# Patient Record
Sex: FEMALE | Race: WHITE | NOT HISPANIC OR LATINO | Employment: FULL TIME | URBAN - NONMETROPOLITAN AREA
[De-identification: names, ages, dates, MRNs, and addresses within clinical notes are randomized per-mention and may not be internally consistent; named-entity substitution may affect disease eponyms.]

---

## 2017-01-16 ENCOUNTER — OFFICE VISIT (OUTPATIENT)
Dept: OTOLARYNGOLOGY | Facility: CLINIC | Age: 57
End: 2017-01-16

## 2017-01-16 VITALS
BODY MASS INDEX: 20.39 KG/M2 | TEMPERATURE: 98 F | DIASTOLIC BLOOD PRESSURE: 70 MMHG | SYSTOLIC BLOOD PRESSURE: 116 MMHG | WEIGHT: 108 LBS | RESPIRATION RATE: 20 BRPM | HEIGHT: 61 IN | HEART RATE: 73 BPM

## 2017-01-16 DIAGNOSIS — D49.0 OROPHARYNX NEOPLASM: ICD-10-CM

## 2017-01-16 DIAGNOSIS — K13.70 ORAL MUCOSAL LESION: Primary | ICD-10-CM

## 2017-01-16 PROCEDURE — 99213 OFFICE O/P EST LOW 20 MIN: CPT | Performed by: OTOLARYNGOLOGY

## 2017-01-16 NOTE — PROGRESS NOTES
PRIMARY CARE PROVIDER: Romulo Crawford DMD  REFERRING PROVIDER: No ref. provider found    Chief Complaint   Patient presents with   • Follow-up     BIOPSY OF LESION OF THE LEFT ANTERIOR TONSILLAR       Subjective   History of Present Illness:  Mica Humphrey is a  56 y.o.  female who is here for follow up. She has had continued problems with oral lesions.  Ports the lesions feel itchy and slightly dry.  There is no pain associated with them.  They are somewhat migratory, there were worse in the left, now they are worse in the right.  She denies any additional mucosal lesions in the genital area.  She did have a white blood cell count was slightly elevated Dr. Almonte office.  She has switched her toothpaste since this will help.  Her symptoms are unchanged.  The Allegra that she tried did nothing for these lesions.    Review of Systems:  Review of Systems   Constitutional: Negative for chills, fatigue and fever.   HENT: Positive for mouth sores and postnasal drip. Negative for nosebleeds, trouble swallowing and voice change.    Eyes: Negative for visual disturbance.   Respiratory: Negative for shortness of breath.    Genitourinary: Negative for genital sores.   Musculoskeletal: Negative for arthralgias and myalgias.   Skin: Negative for color change.   Neurological: Negative for headaches.   Hematological: Negative for adenopathy.   Psychiatric/Behavioral: Negative for agitation and behavioral problems.       Past History:  Past Medical History   Diagnosis Date   • Oropharynx neoplasm    • S/P skin biopsy      Past Surgical History   Procedure Laterality Date   • Neck surgery     • Hysterectomy     • Bladder surgery     • Skin biopsy       LEFT ANTERIOR TONSILLAR     Family History   Problem Relation Age of Onset   • Diabetes Mother    • Diabetes Father      Social History   Substance Use Topics   • Smoking status: Never Smoker   • Smokeless tobacco: None   • Alcohol use Yes      Comment: SOCIAL     Allergies:   Review of patient's allergies indicates no known allergies.    Current Outpatient Prescriptions:   •  metFORMIN (GLUCOPHAGE) 500 MG tablet, TAKE 1 TABLET BY MOUTH DAILY WITH SUPPER, Disp: , Rfl: 5      Objective     Vital Signs:  Temp:  [98 °F (36.7 °C)] 98 °F (36.7 °C)  Heart Rate:  [73] 73  Resp:  [20] 20  BP: (116)/(70) 116/70    Physical Exam:  Physical Exam   Constitutional: She is oriented to person, place, and time. She appears well-developed and well-nourished. She is cooperative. No distress.   HENT:   Head: Normocephalic and atraumatic.   Right Ear: Hearing and external ear normal. No drainage.   Left Ear: Hearing and external ear normal. No drainage.   Nose: Mucosal edema present.   Mouth/Throat: Uvula is midline and oropharynx is clear and moist. Mucous membranes are not pale and not dry. Oral lesions present.       Erythemetous, linear raised areas R>L of the posterior pharynx above the level of the soft palate.   Eyes: Conjunctivae and EOM are normal. Pupils are equal, round, and reactive to light. Right eye exhibits no discharge. Left eye exhibits no discharge. No scleral icterus.   Neck: Normal range of motion. Neck supple. No JVD present. No tracheal deviation present. No thyromegaly present.   Pulmonary/Chest: Effort normal. No stridor.   Musculoskeletal: Normal range of motion. She exhibits no edema or deformity.   Lymphadenopathy:     She has no cervical adenopathy.   Neurological: She is alert and oriented to person, place, and time. She has normal strength. No cranial nerve deficit. Coordination normal.   Skin: Skin is warm and dry. No rash noted. She is not diaphoretic. No erythema. No pallor.   Psychiatric: She has a normal mood and affect. Her speech is normal and behavior is normal. Judgment and thought content normal. Cognition and memory are normal.   Nursing note and vitals reviewed.      Results Review:   Pathology from [facility]: subacute mucositis and fibrous hyperplasia    Assessment    Assessment:  1. Oral mucosal lesion    2. Oropharynx neoplasm        Plan   Will assess for autoimmune process.  She is also switching her toothpaste.  If labs normal and no better on toothpaste switch, may try cessation of metformin or peridex rinses.  If lesion appear worse, will consider rebiopsy.      Return in about 3 weeks (around 2/6/2017).    My findings and recommendations were discussed and questions were answered.     Tushar Ovalle MD  01/16/17  2:10 PM

## 2017-01-17 ENCOUNTER — LAB (OUTPATIENT)
Dept: LAB | Facility: HOSPITAL | Age: 57
End: 2017-01-17
Attending: OTOLARYNGOLOGY

## 2017-01-17 DIAGNOSIS — K13.70 ORAL MUCOSAL LESION: ICD-10-CM

## 2017-01-17 LAB
CRP SERPL-MCNC: <0.5 MG/DL (ref 0–0.99)
ERYTHROCYTE [SEDIMENTATION RATE] IN BLOOD: 1 MM/HR (ref 0–20)
IRON 24H UR-MRATE: 74 MCG/DL (ref 42–180)
IRON SATN MFR SERPL: 21 % (ref 20–45)
TIBC SERPL-MCNC: 357 MCG/DL (ref 225–420)
TSH SERPL DL<=0.05 MIU/L-ACNC: 1.6 MIU/ML (ref 0.47–4.68)

## 2017-01-17 PROCEDURE — 86256 FLUORESCENT ANTIBODY TITER: CPT | Performed by: OTOLARYNGOLOGY

## 2017-01-17 PROCEDURE — 36415 COLL VENOUS BLD VENIPUNCTURE: CPT

## 2017-01-17 PROCEDURE — 84443 ASSAY THYROID STIM HORMONE: CPT | Performed by: OTOLARYNGOLOGY

## 2017-01-17 PROCEDURE — 86038 ANTINUCLEAR ANTIBODIES: CPT | Performed by: OTOLARYNGOLOGY

## 2017-01-17 PROCEDURE — 86256 FLUORESCENT ANTIBODY TITER: CPT

## 2017-01-17 PROCEDURE — 83540 ASSAY OF IRON: CPT | Performed by: OTOLARYNGOLOGY

## 2017-01-17 PROCEDURE — 85651 RBC SED RATE NONAUTOMATED: CPT | Performed by: OTOLARYNGOLOGY

## 2017-01-17 PROCEDURE — 83550 IRON BINDING TEST: CPT | Performed by: OTOLARYNGOLOGY

## 2017-01-17 PROCEDURE — 86140 C-REACTIVE PROTEIN: CPT | Performed by: OTOLARYNGOLOGY

## 2017-01-18 LAB
ANA SER QL: NEGATIVE
C-ANCA TITR SER IF: NORMAL TITER
P-ANCA ATYPICAL TITR SER IF: NORMAL TITER
P-ANCA TITR SER IF: NORMAL TITER

## 2017-01-25 ENCOUNTER — TELEPHONE (OUTPATIENT)
Dept: OTOLARYNGOLOGY | Facility: CLINIC | Age: 57
End: 2017-01-25

## 2017-01-25 NOTE — TELEPHONE ENCOUNTER
Called and left message on voicemail.  TSH/iron normal.  Normal inflammatory markers and no autoimmune positivity.  Continue alterations in toothpaste.  May try stopping metformin.  May try peridex.  If lesions persist, will consider rebiopsy.

## 2017-02-20 ENCOUNTER — OFFICE VISIT (OUTPATIENT)
Dept: OTOLARYNGOLOGY | Facility: CLINIC | Age: 57
End: 2017-02-20

## 2017-02-20 VITALS
HEIGHT: 61 IN | HEART RATE: 82 BPM | WEIGHT: 111 LBS | BODY MASS INDEX: 20.96 KG/M2 | TEMPERATURE: 98.4 F | DIASTOLIC BLOOD PRESSURE: 64 MMHG | SYSTOLIC BLOOD PRESSURE: 113 MMHG

## 2017-02-20 DIAGNOSIS — K12.30 OROPHARYNGEAL MUCOSITIS: ICD-10-CM

## 2017-02-20 DIAGNOSIS — K13.70 ORAL MUCOSAL LESION: Primary | ICD-10-CM

## 2017-02-20 DIAGNOSIS — D49.0 OROPHARYNX NEOPLASM: ICD-10-CM

## 2017-02-20 PROCEDURE — 99213 OFFICE O/P EST LOW 20 MIN: CPT | Performed by: OTOLARYNGOLOGY

## 2017-02-20 RX ORDER — ESCITALOPRAM OXALATE 10 MG/1
TABLET ORAL
COMMUNITY
Start: 2017-02-09

## 2017-02-20 NOTE — PROGRESS NOTES
PRIMARY CARE PROVIDER: Romulo Crawford DMD  REFERRING PROVIDER: No ref. provider found    Chief Complaint   Patient presents with   • Mouth Lesions     Patient here today for a follow up and states theres no change.       Subjective   History of Present Illness:  Mica Humphrey is a  56 y.o.  female who is here for follow up. She has had continued problems with oral lesions.  She reports the lesions feel itchy and slightly dry.  There is no pain associated with them.  They are somewhat migratory, there were worse in the left, now they are worse in the right.  She denies any additional mucosal lesions in the genital area.  She did have a white blood cell count was slightly elevated Dr. Almonte office.  She has switched her toothpaste and has stopped taking Metformin to see if this will help.  Her symptoms are unchanged.  The Allegra that she tried did nothing for these lesions. She has had lab to rule out an autoimmune process- all were normal    Review of Systems:  Review of Systems   Constitutional: Negative for chills, fatigue and fever.   HENT: Positive for mouth sores and postnasal drip. Negative for nosebleeds, trouble swallowing and voice change.    Eyes: Negative for visual disturbance.   Respiratory: Negative for shortness of breath.    Genitourinary: Negative for genital sores.   Musculoskeletal: Negative for arthralgias and myalgias.   Skin: Negative for color change.   Neurological: Negative for headaches.   Hematological: Negative for adenopathy.   Psychiatric/Behavioral: Negative for agitation and behavioral problems.       Past History:  Past Medical History   Diagnosis Date   • Oropharynx neoplasm    • S/P skin biopsy      Past Surgical History   Procedure Laterality Date   • Neck surgery     • Hysterectomy     • Bladder surgery     • Skin biopsy       LEFT ANTERIOR TONSILLAR     Family History   Problem Relation Age of Onset   • Diabetes Mother    • Diabetes Father      Social History   Substance Use  Topics   • Smoking status: Never Smoker   • Smokeless tobacco: None   • Alcohol use Yes      Comment: SOCIAL     Allergies:  Review of patient's allergies indicates no known allergies.    Current Outpatient Prescriptions:   •  metFORMIN (GLUCOPHAGE) 500 MG tablet, TAKE 1 TABLET BY MOUTH DAILY WITH SUPPER, Disp: , Rfl: 5  •  escitalopram (LEXAPRO) 10 MG tablet, , Disp: , Rfl:       Objective     Vital Signs:  Temp:  [98.4 °F (36.9 °C)] 98.4 °F (36.9 °C)  Heart Rate:  [82] 82  BP: (113)/(64) 113/64    Physical Exam:  Physical Exam   Constitutional: She is oriented to person, place, and time. She appears well-developed and well-nourished. She is cooperative. No distress.   HENT:   Head: Normocephalic and atraumatic.   Right Ear: Hearing and external ear normal. No drainage.   Left Ear: Hearing and external ear normal. No drainage.   Nose: Mucosal edema present.   Mouth/Throat: Uvula is midline and oropharynx is clear and moist. Mucous membranes are not pale and not dry. Oral lesions present.       Erythemetous, linear raised areas R>L of the posterior pharynx above the level of the soft palate.   Eyes: Conjunctivae and EOM are normal. Pupils are equal, round, and reactive to light. Right eye exhibits no discharge. Left eye exhibits no discharge. No scleral icterus.   Neck: Normal range of motion. Neck supple. No JVD present. No tracheal deviation present. No thyromegaly present.   Pulmonary/Chest: Effort normal. No stridor.   Musculoskeletal: Normal range of motion. She exhibits no edema or deformity.   Lymphadenopathy:     She has no cervical adenopathy.   Neurological: She is alert and oriented to person, place, and time. She has normal strength. No cranial nerve deficit. Coordination normal.   Skin: Skin is warm and dry. No rash noted. She is not diaphoretic. No erythema. No pallor.   Psychiatric: She has a normal mood and affect. Her speech is normal and behavior is normal. Judgment and thought content normal.  Cognition and memory are normal.   Nursing note and vitals reviewed.      Results Review:   Pathology from [facility]: subacute mucositis and fibrous hyperplasia    Assessment   Assessment:  1. Oral mucosal lesion    2. Oropharynx neoplasm    3. Oropharyngeal mucositis        Plan   Discussed trying Peridex mouth rinse vs rebiopsy of lesion. She would like to proceed with rebiopsy. I will referral her to Dr. Asencio at  for further evaluation and management, possible biopsy.       Return if symptoms worsen or fail to improve, for Recheck.    My findings and recommendations were discussed and questions were answered.     Tushar Ovalle MD   02/20/17  1:43 PM

## 2017-03-14 ENCOUNTER — TELEPHONE (OUTPATIENT)
Dept: OTOLARYNGOLOGY | Facility: CLINIC | Age: 57
End: 2017-03-14

## 2017-03-14 NOTE — TELEPHONE ENCOUNTER
Referral complete to [facility] @ [facility].  Order from Dr. Ovalle specified patient see Dr. Asencio-However, Dr. Pop Asencio no longer sees patients in an office setting-but rather is a consultant for the [facility]. Patient will be scheduled with one of their physicians and if deemed necessary during her visit they will ask Dr. Asencio or one of the other pathologist to assit with exam findings.     Patient notified regarding appointment and was provided with  [facility] number to schedule her appointment. Records faxed.     [phone]  Fax [phone]

## 2019-04-26 ENCOUNTER — OFFICE VISIT (OUTPATIENT)
Dept: GASTROENTEROLOGY | Facility: CLINIC | Age: 59
End: 2019-04-26

## 2019-04-26 VITALS
HEART RATE: 88 BPM | DIASTOLIC BLOOD PRESSURE: 68 MMHG | SYSTOLIC BLOOD PRESSURE: 120 MMHG | OXYGEN SATURATION: 98 % | TEMPERATURE: 97 F | HEIGHT: 61 IN | BODY MASS INDEX: 24.35 KG/M2 | WEIGHT: 129 LBS

## 2019-04-26 DIAGNOSIS — Z12.11 ENCOUNTER FOR SCREENING FOR MALIGNANT NEOPLASM OF COLON: Primary | ICD-10-CM

## 2019-04-26 PROCEDURE — S0285 CNSLT BEFORE SCREEN COLONOSC: HCPCS | Performed by: NURSE PRACTITIONER

## 2019-04-26 RX ORDER — SODIUM PICOSULFATE, MAGNESIUM OXIDE, AND ANHYDROUS CITRIC ACID 10; 3.5; 12 MG/160ML; G/160ML; G/160ML
1 LIQUID ORAL TAKE AS DIRECTED
Qty: 320 ML | Refills: 0 | Status: SHIPPED | OUTPATIENT
Start: 2019-04-26

## 2019-04-26 NOTE — PROGRESS NOTES
Chief Complaint   Patient presents with   • Colonoscopy     12-7-12 had colon normal 5-10 year recall       PCP: Gil Aguilar MD  REFER: No ref. provider found    Subjective     HPI    Mica Humphrey is a 58 y.o. female who presents to office for preventative maintenance.  There is not  a personal history of colon polyps.  There is not a history of colon cancer.  She does not have complaints of nausea/vomiting, change in bowels, weight loss, no BRBPR, no melena.  There is not a family history of colon cancer.  There is not a family history of colon polyps.  She last colonoscopy-2012 .  Bowels do move on regular basis.    [facility]Scope (Dr Delacruz) 2012-normal      Past Medical History:   Diagnosis Date   • Oropharynx neoplasm    • S/P skin biopsy      Past Surgical History:   Procedure Laterality Date   • BLADDER SURGERY     • COLONOSCOPY  12/07/2012    normal   • ENDOSCOPY  03/24/2011    essentially normal   • HYSTERECTOMY     • NECK SURGERY     • SKIN BIOPSY      LEFT ANTERIOR TONSILLAR     No outpatient medications have been marked as taking for the 4/26/19 encounter (Office Visit) with Angel Lugo APRN.     No Known Allergies  Social History     Socioeconomic History   • Marital status:      Spouse name: Not on file   • Number of children: Not on file   • Years of education: Not on file   • Highest education level: Not on file   Tobacco Use   • Smoking status: Never Smoker   • Smokeless tobacco: Never Used   Substance and Sexual Activity   • Alcohol use: Yes     Comment: SOCIAL   • Drug use: No     Family History   Problem Relation Age of Onset   • Diabetes Mother    • Diabetes Father    • Colon cancer Neg Hx    • Colon polyps Neg Hx    • Esophageal cancer Neg Hx      Review of Systems   Constitutional: Negative for fatigue, fever and unexpected weight change.   HENT: Negative for hearing loss, sore throat and voice change.    Eyes: Negative for visual disturbance.   Respiratory: Negative for cough,  shortness of breath and wheezing.    Cardiovascular: Negative for chest pain and palpitations.   Gastrointestinal: Negative for abdominal pain, blood in stool and vomiting.   Endocrine: Negative for polydipsia and polyuria.   Genitourinary: Negative for difficulty urinating, dysuria, hematuria and urgency.   Musculoskeletal: Negative for joint swelling and myalgias.   Skin: Negative for color change, rash and wound.   Neurological: Negative for dizziness, tremors, seizures and syncope.   Hematological: Does not bruise/bleed easily.   Psychiatric/Behavioral: Negative for agitation and confusion. The patient is not nervous/anxious.      Objective   Vitals:    04/26/19 0929   BP: 120/68   Pulse: 88   Temp: 97 °F (36.1 °C)   SpO2: 98%     Physical Exam   Constitutional: She is oriented to person, place, and time. She appears well-developed and well-nourished. She is cooperative.   HENT:   Head: Normocephalic and atraumatic.   Eyes: Conjunctivae are normal. Pupils are equal, round, and reactive to light. No scleral icterus.   Neck: Normal range of motion. Neck supple. No JVD present. No thyroid mass and no thyromegaly present.   Cardiovascular: Normal rate, regular rhythm and normal heart sounds. Exam reveals no gallop and no friction rub.   No murmur heard.  Pulmonary/Chest: Effort normal and breath sounds normal. No accessory muscle usage. No respiratory distress. She has no wheezes. She has no rales.   Abdominal: Soft. Normal appearance and bowel sounds are normal. She exhibits no distension, no ascites and no mass. There is no hepatosplenomegaly. There is no tenderness. There is no rebound and no guarding.   Musculoskeletal: Normal range of motion. She exhibits no edema or tenderness.     Vascular Status -  Her right foot exhibits normal foot vasculature  and no edema. Her left foot exhibits normal foot vasculature  and no edema.  Lymphadenopathy:     She has no cervical adenopathy.   Neurological: She is alert and  oriented to person, place, and time. She has normal strength. Gait normal.   Skin: Skin is warm, dry and intact. No rash noted.     Imaging Results (most recent)     None        Body mass index is 24.37 kg/m².    Assessment/Plan   Mica was seen today for colonoscopy.    Diagnoses and all orders for this visit:    Encounter for screening for malignant neoplasm of colon  -     CLENPIQ 10-3.5-12 MG-GM -GM/160ML solution; Take 1 each by mouth Take As Directed.  -     Case Request; Standing  -     Implement Anesthesia Orders Day of Procedure; Standing  -     Obtain Informed Consent; Standing      * Surgery not found *    Advised pt to stop ASA, use of NSAIDs, Fish Oil, and MV 5 days prior to procedure, per Dr Delacruz protocol.  Tylenol based products are ok to take.  Pt verbalized understanding.    All risks, benefits, alternatives, and indications of colonoscopy procedure have been discussed with the patient. Risks to include perforation of the colon requiring possible surgery or colostomy, risk of bleeding from biopsies or removal of colon tissue, possibility of missing a colon polyp or cancer, or adverse drug reaction.  Benefits to include the diagnosis and management of disease of the colon and rectum. Alternatives to include barium enema, radiographic evaluation, lab testing or no intervention. She verbalizes understanding and agrees.     Patient's Body mass index is 24.37 kg/m². BMI is above normal parameters. Recommendations include: no follow up.      There are no Patient Instructions on file for this visit.

## 2019-05-17 NOTE — MR AVS SNAPSHOT
"                        Mica Humphrey   1/16/2017 1:15 PM   Office Visit    Dept Phone:  [phone]   Encounter #:  [id number]    Provider:  Tushar Ovalle MD   Department:  [facility]                Your Full Care Plan              Your Updated Medication List          This list is accurate as of: 1/16/17  2:12 PM.  Always use your most recent med list.                metFORMIN 500 MG tablet   Commonly known as:  GLUCOPHAGE               We Performed the Following     Tissue Exam       You Were Diagnosed With        Codes Comments    Oral mucosal lesion    -  Primary ICD-10-CM: K13.70  ICD-9-CM: 528.9     Oropharynx neoplasm     ICD-10-CM: D49.0  ICD-9-CM: 239.0       Instructions     None    Patient Instructions History      Upcoming Appointments     Visit Type Date Time Department    FOLLOW UP 1/16/2017  1:15 PM [facility]      MyChart Signup     Our records indicate that you have declined [facility] MyChart signup. If you would like to sign up for [facility], please email [email or web address] or call [phone] to obtain an activation code.             Other Info from Your Visit           Allergies     No Known Allergies      Reason for Visit     Follow-up BIOPSY OF LESION OF THE LEFT ANTERIOR TONSILLAR      Vital Signs     Blood Pressure Pulse Temperature Respirations Height Weight    116/70 73 98 °F (36.7 °C) 20 61\" (154.9 cm) 108 lb (49 kg)    Body Mass Index Smoking Status                20.41 kg/m2 Never Smoker          Problems and Diagnoses Noted     Oropharynx neoplasm    Disease of mouth    -  Primary        "
done

## 2019-05-29 ENCOUNTER — ANESTHESIA (OUTPATIENT)
Dept: GASTROENTEROLOGY | Facility: HOSPITAL | Age: 59
End: 2019-05-29

## 2019-05-29 ENCOUNTER — HOSPITAL ENCOUNTER (OUTPATIENT)
Facility: HOSPITAL | Age: 59
Setting detail: HOSPITAL OUTPATIENT SURGERY
Discharge: HOME | End: 2019-05-29
Attending: INTERNAL MEDICINE | Admitting: INTERNAL MEDICINE

## 2019-05-29 ENCOUNTER — ANESTHESIA EVENT (OUTPATIENT)
Dept: GASTROENTEROLOGY | Facility: HOSPITAL | Age: 59
End: 2019-05-29

## 2019-05-29 VITALS
BODY MASS INDEX: 24.35 KG/M2 | TEMPERATURE: 97.5 F | HEIGHT: 61 IN | WEIGHT: 129 LBS | SYSTOLIC BLOOD PRESSURE: 123 MMHG | DIASTOLIC BLOOD PRESSURE: 76 MMHG | OXYGEN SATURATION: 100 % | RESPIRATION RATE: 14 BRPM | HEART RATE: 71 BPM

## 2019-05-29 DIAGNOSIS — Z12.11 ENCOUNTER FOR SCREENING FOR MALIGNANT NEOPLASM OF COLON: ICD-10-CM

## 2019-05-29 PROCEDURE — G0121 COLON CA SCRN NOT HI RSK IND: HCPCS | Performed by: INTERNAL MEDICINE

## 2019-05-29 PROCEDURE — 25010000002 PROPOFOL 10 MG/ML EMULSION: Performed by: NURSE ANESTHETIST, CERTIFIED REGISTERED

## 2019-05-29 RX ORDER — SODIUM CHLORIDE 0.9 % (FLUSH) 0.9 %
3 SYRINGE (ML) INJECTION AS NEEDED
Status: DISCONTINUED | OUTPATIENT
Start: 2019-05-29 | End: 2019-05-29 | Stop reason: HOSPADM

## 2019-05-29 RX ORDER — PROPOFOL 10 MG/ML
VIAL (ML) INTRAVENOUS AS NEEDED
Status: DISCONTINUED | OUTPATIENT
Start: 2019-05-29 | End: 2019-05-29 | Stop reason: SURG

## 2019-05-29 RX ORDER — SODIUM CHLORIDE 9 MG/ML
500 INJECTION, SOLUTION INTRAVENOUS CONTINUOUS PRN
Status: DISCONTINUED | OUTPATIENT
Start: 2019-05-29 | End: 2019-05-29 | Stop reason: HOSPADM

## 2019-05-29 RX ADMIN — SODIUM CHLORIDE: 0.9 INJECTION, SOLUTION INTRAVENOUS at 08:03

## 2019-05-29 RX ADMIN — PROPOFOL 50 MG: 10 INJECTION, EMULSION INTRAVENOUS at 08:05

## 2019-05-29 RX ADMIN — PROPOFOL 50 MG: 10 INJECTION, EMULSION INTRAVENOUS at 08:14

## 2019-05-29 RX ADMIN — PROPOFOL 50 MG: 10 INJECTION, EMULSION INTRAVENOUS at 08:09

## 2019-05-29 NOTE — ANESTHESIA POSTPROCEDURE EVALUATION
Patient: Mica Humphrey    Procedure Summary     Date:  05/29/19 Room / Location:  [facility] ENDOSCOPY 5 / [facility] ENDOSCOPY    Anesthesia Start:  0803 Anesthesia Stop:  0819    Procedure:  COLONOSCOPY WITH ANESTHESIA (N/A ) Diagnosis:       Encounter for screening for malignant neoplasm of colon      (Encounter for screening for malignant neoplasm of colon [Z12.11])    Surgeon:  Owen Delacruz DO Provider:  Vamsi Estrella CRNA    Anesthesia Type:  MAC ASA Status:  1          Anesthesia Type: MAC  Last vitals  BP   125/74 (05/29/19 0720)   Temp   97.5 °F (36.4 °C) (05/29/19 0720)   Pulse   80 (05/29/19 0720)   Resp         SpO2   99 % (05/29/19 0720)     Post Anesthesia Care and Evaluation    Patient location during evaluation: PHASE II  Patient participation: complete - patient participated  Level of consciousness: awake and alert  Pain score: 0  Pain management: adequate  Airway patency: patent  Anesthetic complications: No anesthetic complications  PONV Status: none  Cardiovascular status: acceptable and stable  Respiratory status: acceptable  Hydration status: acceptable

## 2019-05-29 NOTE — ANESTHESIA PREPROCEDURE EVALUATION
Anesthesia Evaluation     Patient summary reviewed   no history of anesthetic complications:  NPO Solid Status: > 8 hours             Airway   Mallampati: I  TM distance: >3 FB  Neck ROM: full  Dental      Pulmonary - negative pulmonary ROS   Cardiovascular - negative cardio ROS  Exercise tolerance: excellent (>7 METS)        Neuro/Psych- negative ROS  GI/Hepatic/Renal/Endo - negative ROS     Musculoskeletal     Abdominal    Substance History      OB/GYN          Other                        Anesthesia Plan    ASA 1     MAC     Anesthetic plan, all risks, benefits, and alternatives have been provided, discussed and informed consent has been obtained with: patient.

## 2019-07-10 ENCOUNTER — TELEPHONE (OUTPATIENT)
Dept: GASTROENTEROLOGY | Facility: CLINIC | Age: 59
End: 2019-07-10

## 2023-05-18 ENCOUNTER — HOSPITAL ENCOUNTER (OUTPATIENT)
Dept: ULTRASOUND IMAGING | Facility: HOSPITAL | Age: 63
Discharge: HOME | End: 2023-05-18
Admitting: PHYSICIAN ASSISTANT
Payer: COMMERCIAL

## 2023-05-18 ENCOUNTER — TRANSCRIBE ORDERS (OUTPATIENT)
Dept: ADMINISTRATIVE | Facility: HOSPITAL | Age: 63
End: 2023-05-18
Payer: COMMERCIAL

## 2023-05-18 DIAGNOSIS — R11.2 NAUSEA AND VOMITING, UNSPECIFIED VOMITING TYPE: ICD-10-CM

## 2023-05-18 DIAGNOSIS — R10.816 EPIGASTRIC ABDOMINAL TENDERNESS, REBOUND TENDERNESS PRESENCE NOT SPECIFIED: ICD-10-CM

## 2023-05-18 DIAGNOSIS — R10.816 EPIGASTRIC ABDOMINAL TENDERNESS, REBOUND TENDERNESS PRESENCE NOT SPECIFIED: Primary | ICD-10-CM

## 2023-05-18 PROCEDURE — 76705 ECHO EXAM OF ABDOMEN: CPT

## 2023-05-23 ENCOUNTER — PRE-ADMISSION TESTING (OUTPATIENT)
Dept: PREADMISSION TESTING | Facility: HOSPITAL | Age: 63
End: 2023-05-23
Payer: COMMERCIAL

## 2023-05-23 ENCOUNTER — OFFICE VISIT (OUTPATIENT)
Dept: SURGERY | Facility: CLINIC | Age: 63
End: 2023-05-23
Payer: COMMERCIAL

## 2023-05-23 VITALS
DIASTOLIC BLOOD PRESSURE: 74 MMHG | HEIGHT: 60 IN | WEIGHT: 144.4 LBS | HEART RATE: 81 BPM | RESPIRATION RATE: 18 BRPM | BODY MASS INDEX: 28.35 KG/M2 | SYSTOLIC BLOOD PRESSURE: 149 MMHG | OXYGEN SATURATION: 96 %

## 2023-05-23 VITALS
HEART RATE: 84 BPM | WEIGHT: 140 LBS | DIASTOLIC BLOOD PRESSURE: 85 MMHG | OXYGEN SATURATION: 96 % | HEIGHT: 61 IN | SYSTOLIC BLOOD PRESSURE: 163 MMHG | BODY MASS INDEX: 26.43 KG/M2

## 2023-05-23 DIAGNOSIS — K80.20 CALCULUS OF GALLBLADDER WITHOUT CHOLECYSTITIS WITHOUT OBSTRUCTION: Primary | ICD-10-CM

## 2023-05-23 DIAGNOSIS — K80.20 CALCULUS OF GALLBLADDER WITHOUT CHOLECYSTITIS WITHOUT OBSTRUCTION: ICD-10-CM

## 2023-05-23 LAB
ALBUMIN SERPL-MCNC: 4.5 G/DL (ref 3.5–5.2)
ALBUMIN/GLOB SERPL: 1.8 G/DL
ALP SERPL-CCNC: 100 U/L (ref 39–117)
ALT SERPL W P-5'-P-CCNC: 43 U/L (ref 1–33)
ANION GAP SERPL CALCULATED.3IONS-SCNC: 10 MMOL/L (ref 5–15)
AST SERPL-CCNC: 28 U/L (ref 1–32)
BILIRUB SERPL-MCNC: 0.5 MG/DL (ref 0–1.2)
BUN SERPL-MCNC: 21 MG/DL (ref 8–23)
BUN/CREAT SERPL: 30.9 (ref 7–25)
CALCIUM SPEC-SCNC: 9.5 MG/DL (ref 8.6–10.5)
CHLORIDE SERPL-SCNC: 105 MMOL/L (ref 98–107)
CO2 SERPL-SCNC: 27 MMOL/L (ref 22–29)
CREAT SERPL-MCNC: 0.68 MG/DL (ref 0.57–1)
DEPRECATED RDW RBC AUTO: 43.3 FL (ref 37–54)
EGFRCR SERPLBLD CKD-EPI 2021: 99.2 ML/MIN/1.73
ERYTHROCYTE [DISTWIDTH] IN BLOOD BY AUTOMATED COUNT: 13.2 % (ref 12.3–15.4)
GLOBULIN UR ELPH-MCNC: 2.5 GM/DL
GLUCOSE SERPL-MCNC: 103 MG/DL (ref 65–99)
HCT VFR BLD AUTO: 42.5 % (ref 34–46.6)
HGB BLD-MCNC: 13.8 G/DL (ref 12–15.9)
MCH RBC QN AUTO: 29.1 PG (ref 26.6–33)
MCHC RBC AUTO-ENTMCNC: 32.5 G/DL (ref 31.5–35.7)
MCV RBC AUTO: 89.7 FL (ref 79–97)
PLATELET # BLD AUTO: 202 10*3/MM3 (ref 140–450)
PMV BLD AUTO: 12.1 FL (ref 6–12)
POTASSIUM SERPL-SCNC: 4.1 MMOL/L (ref 3.5–5.2)
PROT SERPL-MCNC: 7 G/DL (ref 6–8.5)
RBC # BLD AUTO: 4.74 10*6/MM3 (ref 3.77–5.28)
SODIUM SERPL-SCNC: 142 MMOL/L (ref 136–145)
WBC NRBC COR # BLD: 7.69 10*3/MM3 (ref 3.4–10.8)

## 2023-05-23 PROCEDURE — 85027 COMPLETE CBC AUTOMATED: CPT

## 2023-05-23 PROCEDURE — 80053 COMPREHEN METABOLIC PANEL: CPT

## 2023-05-23 PROCEDURE — 36415 COLL VENOUS BLD VENIPUNCTURE: CPT

## 2023-05-23 RX ORDER — INDOCYANINE GREEN AND WATER 25 MG
3.75 KIT INJECTION ONCE
Status: CANCELLED | OUTPATIENT
Start: 2023-05-23 | End: 2023-05-23

## 2023-05-23 RX ORDER — PRAVASTATIN SODIUM 10 MG
10 TABLET ORAL DAILY
COMMUNITY

## 2023-05-23 NOTE — PROGRESS NOTES
Patient: Mica Humphrey    YOB: 1960    Date: 05/23/2023    Primary Care Provider: Freddie Kellogg MD    Chief Complaint   Patient presents with    Cholelithiasis     Mrs. Humphrey is here for a consult for her gallbladder.       Subjective .     History of present illness:   For several months now she has had intermittent attacks of upper abdominal pain nausea vomiting and food intolerance.  She lives in [place] now and has not been able to get the work-up done down there.  She is back up here visiting and had a severe attack last week 6 to get her ultrasound done which shows gallstones.  Pain described as severe intermittent frequent located epigastrium right upper quadrant    The following portions of the patient's history were reviewed and updated as appropriate: allergies, current medications, past family history, past medical history, past social history, past surgical history and problem list.      History:  Past Medical History:   Diagnosis Date    Oropharynx neoplasm     S/P skin biopsy           Past Surgical History:   Procedure Laterality Date    BLADDER SURGERY      COLONOSCOPY  12/07/2012    normal    COLONOSCOPY N/A 5/29/2019    Procedure: COLONOSCOPY WITH ANESTHESIA;  Surgeon: Owen Delacruz DO;  Location: [facility] ENDOSCOPY;  Service: Gastroenterology    COSMETIC SURGERY      breast implants    ENDOSCOPY  03/24/2011    essentially normal    HYSTERECTOMY      NECK SURGERY      SKIN BIOPSY      LEFT ANTERIOR TONSILLAR       Family History   Problem Relation Age of Onset    Diabetes Mother     Diabetes Father     Colon cancer Neg Hx     Colon polyps Neg Hx     Esophageal cancer Neg Hx        Social History     Tobacco Use    Smoking status: Never    Smokeless tobacco: Never   Substance Use Topics    Alcohol use: Yes     Comment: SOCIAL    Drug use: No       Allergies:  No Known Allergies    Medications:     Current Outpatient Medications:     CLENPIQ 10-3.5-12 MG-GM -GM/160ML  "solution, Take 1 each by mouth Take As Directed., Disp: 320 mL, Rfl: 0    metFORMIN (GLUCOPHAGE) 500 MG tablet, TAKE 1 TABLET BY MOUTH DAILY WITH SUPPER, Disp: , Rfl: 5    pravastatin (PRAVACHOL) 10 MG tablet, Take 1 tablet by mouth Daily., Disp: , Rfl:     escitalopram (LEXAPRO) 10 MG tablet, , Disp: , Rfl:     Objective     Vital Signs:   Vitals:    05/23/23 1000   BP: 163/85   BP Location: Left arm   Patient Position: Sitting   Cuff Size: Adult   Pulse: 84   SpO2: 96%   Weight: 63.5 kg (140 lb)   Height: 154.9 cm (61\")       Physical Exam:     General Appearance:    Alert, cooperative, in no acute distress   Head:    Normocephalic, without obvious abnormality, atraumatic   Eyes:            Lids and lashes normal, conjunctivae and sclerae normal, no  icterus, no pallor, corneas clear,   Ears:    Ears appear intact with no abnormalities noted   Breast:     deferred   Lungs:     Clear to auscultation,respirations regular, even and              Unlabored    Heart:    Regular rhythm and normal rate, no murmur, no gallop.   Chest Wall:    No abnormalities observed   Abdomen    Rectal:   Soft nondistended.  Tender in the epigastric and right upper quadrants.  I do not appreciate organomegaly or masses.  Good bowel sounds    Deferred   Extremities:   Moves all extremities well, no edema, no cyanosis, no          redness   Pulses:   Pulses palpable and equal bilaterally   Skin:   No bleeding, bruising or rash   Lymph nodes:   No palpable adenopathy   Neurologic:   Cranial nerves 2 - 12 grossly intact.         Results Review:   I reviewed the patient's new clinical results.        Assessment / Plan:    Diagnoses and all orders for this visit:    1. Calculus of gallbladder without cholecystitis without obstruction (Primary)  -     Case Request; Standing  -     CBC (No Diff); Future  -     Comprehensive Metabolic Panel; Future  -     ceFAZolin (ANCEF) 2 g in sodium chloride 0.9 % 100 mL IVPB  -     indocyanine green " (IC-GREEN) injection 3.75 mg  -     Case Request    Other orders  -     Follow Anesthesia Guidelines / Protocol; Future  -     Follow Anesthesia Guidelines / Protocol; Standing  -     Obtain Informed Consent; Standing  -     Verify / Perform Chlorhexidine Skin Prep; Standing  -     Verify / Perform Chlorhexidine Skin Prep if Indicated (If Not Already Completed); Standing  -     Provide NPO Instructions to Patient; Future  -     Chlorhexidine Skin Prep; Future        BMI is >= 25 and <30. (Overweight) The following options were offered after discussion;: exercise counseling/recommendations and nutrition counseling/recommendations    #1 symptomatic cholelithiasis-we discussed laparoscopic versus robotic repair versus observation.  The risks of bleeding infection injury to surrounding structures persistence persistence of some symptoms all discussed.  We will proceed with robotic repair on Friday      Electronically signed by Tiffany Ortega MD  05/23/23  10:12 CDT

## 2023-05-23 NOTE — DISCHARGE INSTRUCTIONS
Before you come to the hospital        Arrival time: AS DIRECTED BY OFFICE     YOU MAY TAKE THE FOLLOWING MEDICATION(S) THE MORNING OF SURGERY WITH A SIP OF WATER: none           ALL OTHER HOME MEDICATION CHECK WITH YOUR PHYSICIAN (especially if   you are taking diabetes medicines or blood thinners)    Do not take any Erectile Dysfunction medications (EX: CIALIS, VIAGRA) 24 hours prior to surgery.      If you were given and instructed to use a germ- killing soap, use as directed the night before surgery and again the morning of surgery or as directed by your surgeon. (Use one-half of the bottle with each shower.)   See attached information for How to Use Chlorhexidine for Bathing if applicable.            Eating and drinking restrictions prior to scheduled arrival time    2 Hours before arrival time STOP   Drinking Clear liquids (water, black coffee-NO CREAM,  apple juice-no pulp)      8 Hours before arrival time STOP   All food, full liquids, and dairy products    (It is extremely important that you follow these guidelines to prevent delay or cancelation of your procedure)     Clear Liquids  Water and flavored water                                                                      Clear Fruit juices, such as cranberry juice and apple juice.  Black coffee (NO cream of any kind, including powdered).  Plain tea  Clear bouillon or broth.  Flavored gelatin.  Soda.  Gatorade or Powerade.  Full liquid examples  Juices that have pulp.  Frozen ice pops that contain fruit pieces.  Coffee with creamer  Milk.  Yogurt.                MANAGING PAIN AFTER SURGERY    We know you are probably wondering what your pain will be like after surgery.  Following surgery it is unrealistic to expect you will not have pain.   Pain is how our bodies let us know that something is wrong or cautions us to be careful.  That said, our goal is to make your pain tolerable.    Methods we may use to treat your pain include (oral or IV medications,  PCAs, epidurals, nerve blocks, etc.)   While some procedures require IV pain medications for a short time after surgery, transitioning to pain medications by mouth allows for better management of pain.   Your nurse will encourage you to take oral pain medications whenever possible.  IV medications work almost immediately, but only last a short while.  Taking medications by mouth allows for a more constant level of medication in your blood stream for a longer period of time.      Once your pain is out of control it is harder to get back under control.  It is important you are aware when your next dose of pain medication is due.  If you are admitted, your nurse may write the time of your next dose on the white board in your room to help you remember.      We are interested in your pain and encourage you to inform us about aggravating factors during your visit.   Many times a simple repositioning every few hours can make a big difference.    If your physician says it is okay, do not let your pain prevent you from getting out of bed. Be sure to call your nurse for assistance prior to getting up so you do not fall.      Before surgery, please decide your tolerable pain goal.  These faces help describe the pain ratings we use on a 0-10 scale.   Be prepared to tell us your goal and whether or not you take pain or anxiety medications at home.          Preparing for Surgery  Preparing for surgery is an important part of your care. It can make things go more smoothly and help you avoid complications. The steps leading up to surgery may vary among hospitals. Follow all instructions given to you by your health care providers. Ask questions if you do not understand something. Talk about any concerns that you have.  Here are some questions to consider asking before your surgery:  If my surgery is not an emergency (is elective), when would be the best time to have the surgery?  What arrangements do I need to make for work, home,  or school?  What will my recovery be like? How long will it be before I can return to normal activities?  Will I need to prepare my home? Will I need to arrange care for me or my children?  Should I expect to have pain after surgery? What are my pain management options? Are there nonmedical options that I can try for pain?  Tell a health care provider about:  Any allergies you have.  All medicines you are taking, including vitamins, herbs, eye drops, creams, and over-the-counter medicines.  Any problems you or family members have had with anesthetic medicines.  Any blood disorders you have.  Any surgeries you have had.  Any medical conditions you have.  Whether you are pregnant or may be pregnant.  What are the risks?  The risks and complications of surgery depend on the specific procedure that you have. Discuss all the risks with your health care providers before your surgery. Ask about common surgical complications, which may include:  Infection.  Bleeding or a need for blood replacement (transfusion).  Allergic reactions to medicines.  Damage to surrounding nerves, tissues, or structures.  A blood clot.  Scarring.  Failure of the surgery to correct the problem.  Follow these instructions before the procedure:  Several days or weeks before your procedure  You may have a physical exam by your primary health care provider to make sure it is safe for you to have surgery.  You may have testing. This may include a chest X-ray, blood and urine tests, electrocardiogram (ECG), or other testing.  Ask your health care provider about:  Changing or stopping your regular medicines. This is especially important if you are taking diabetes medicines or blood thinners.  Taking medicines such as aspirin and ibuprofen. These medicines can thin your blood. Do not take these medicines unless your health care provider tells you to take them.  Taking over-the-counter medicines, vitamins, herbs, and supplements.  Do not use any products  that contain nicotine or tobacco, such as cigarettes and e-cigarettes. If you need help quitting, ask your health care provider.  Avoid alcohol.  Ask your health care provider if there are exercises you can do to prepare for surgery.  Eat a healthy diet.   Plan to have someone 18 years of age or older to take you home from the hospital. We will need to verify your ride on the morning of surgery if you are being discharged home on the same day. Tell your ride to be expecting a call from the hospital prior to your procedure.   Plan to have a responsible adult care for you for at least 24 hours after you leave the hospital or clinic. This is important.  The day before your procedure  You may be given antibiotic medicine to take by mouth to help prevent infection. Take it as told by your health care provider.  You may be asked to shower with a germ-killing soap.  Follow instructions from your health care provider about eating and drinking restrictions. This includes gum, mints and hard candy.  Pack comfortable clothes according to your procedure.   The day of your procedure  You may need to take another shower with a germ-killing soap before you leave home in the morning.  With a small sip of water, take only the medicines that you are told to take.  Remove all jewelry including rings.   Leave anything you consider valuable at home except hearing aids if needed.  You do not need to bring your home medications into the hospital.   Do not wear any makeup, nail polish, powder, deodorant, lotion, hair accessories, or anything on your skin or body except your clothes.  If you will be staying in the hospital, bring a case to hold your glasses, contacts, or dentures. You may also want to bring your robe and non-skid footwear.       (Do not use denture adhesives since you will be asked to remove them during  surgery).   If you wear oxygen at home, bring it with you the day of surgery.  If instructed by your health care  provider, bring your sleep apnea device with you on the day of your surgery (if this applies to you).  You may want to leave your suitcase and sleep apnea device in the car until after surgery.   Arrive at the hospital as scheduled.  Bring a friend or family member with you who can help to answer questions and be present while you meet with your health care provider.  At the hospital  When you arrive at the hospital:  Go to registration located at the main entrance of the hospital. You will be registered and given a beeper and a sticker sheet. Take the stickers to the Outpatient nurses desk and place in the black tray. This is to notify staff that you have arrived. Then return to the lobby to wait.   When your beeper lights up and vibrates proceed through the double doors, under the stairs, and a member of the Outpatient Surgery staff will escort you to your preoperative room.  You may have to wear compression sleeves. These help to prevent blood clots and reduce swelling in your legs.  An IV may be inserted into one of your veins.              In the operating room, you may be given one or more of the following:        A medicine to help you relax (sedative).        A medicine to numb the area (local anesthetic).        A medicine to make you fall asleep (general anesthetic).        A medicine that is injected into an area of your body to numb everything below the                      injection site (regional anesthetic).  You may be given an antibiotic through your IV to help prevent infection.  Your surgical site will be marked or identified.    Contact a health care provider if you:  Develop a fever of more than 100.4°F (38°C) or other feelings of illness during the 48 hours before your surgery.  Have symptoms that get worse.  Have questions or concerns about your surgery.  Summary  Preparing for surgery can make the procedure go more smoothly and lower your risk of complications.  Before surgery, make a list of  questions and concerns to discuss with your surgeon. Ask about the risks and possible complications.  In the days or weeks before your surgery, follow all instructions from your health care provider. You may need to stop smoking, avoid alcohol, follow eating restrictions, and change or stop your regular medicines.  Contact your surgeon if you develop a fever or other signs of illness during the few days before your surgery.  This information is not intended to replace advice given to you by your health care provider. Make sure you discuss any questions you have with your health care provider.  Document Revised: 12/21/2018 Document Reviewed: 10/23/2018  Elsevier Patient Education © 2021 [employer]

## 2023-05-26 ENCOUNTER — HOSPITAL ENCOUNTER (OUTPATIENT)
Facility: HOSPITAL | Age: 63
Setting detail: HOSPITAL OUTPATIENT SURGERY
Discharge: HOME | End: 2023-05-26
Attending: SPECIALIST | Admitting: SPECIALIST
Payer: COMMERCIAL

## 2023-05-26 ENCOUNTER — ANESTHESIA EVENT (OUTPATIENT)
Dept: PERIOP | Facility: HOSPITAL | Age: 63
End: 2023-05-26
Payer: COMMERCIAL

## 2023-05-26 ENCOUNTER — ANESTHESIA (OUTPATIENT)
Dept: PERIOP | Facility: HOSPITAL | Age: 63
End: 2023-05-26
Payer: COMMERCIAL

## 2023-05-26 VITALS
RESPIRATION RATE: 16 BRPM | HEART RATE: 59 BPM | DIASTOLIC BLOOD PRESSURE: 69 MMHG | TEMPERATURE: 97.6 F | SYSTOLIC BLOOD PRESSURE: 131 MMHG | OXYGEN SATURATION: 93 %

## 2023-05-26 DIAGNOSIS — K80.20 CALCULUS OF GALLBLADDER WITHOUT CHOLECYSTITIS WITHOUT OBSTRUCTION: ICD-10-CM

## 2023-05-26 LAB
GLUCOSE BLDC GLUCOMTR-MCNC: 109 MG/DL (ref 70–130)
GLUCOSE BLDC GLUCOMTR-MCNC: 147 MG/DL (ref 70–130)

## 2023-05-26 PROCEDURE — 25010000002 PROPOFOL 10 MG/ML EMULSION: Performed by: NURSE ANESTHETIST, CERTIFIED REGISTERED

## 2023-05-26 PROCEDURE — 25010000002 SUGAMMADEX 200 MG/2ML SOLUTION: Performed by: NURSE ANESTHETIST, CERTIFIED REGISTERED

## 2023-05-26 PROCEDURE — 25010000002 DEXAMETHASONE PER 1 MG: Performed by: NURSE ANESTHETIST, CERTIFIED REGISTERED

## 2023-05-26 PROCEDURE — 25010000002 CEFAZOLIN PER 500 MG: Performed by: SPECIALIST

## 2023-05-26 PROCEDURE — 25010000002 FENTANYL CITRATE (PF) 100 MCG/2ML SOLUTION: Performed by: NURSE ANESTHETIST, CERTIFIED REGISTERED

## 2023-05-26 PROCEDURE — 82948 REAGENT STRIP/BLOOD GLUCOSE: CPT

## 2023-05-26 PROCEDURE — 25010000002 ONDANSETRON PER 1 MG: Performed by: NURSE ANESTHETIST, CERTIFIED REGISTERED

## 2023-05-26 PROCEDURE — 88304 TISSUE EXAM BY PATHOLOGIST: CPT | Performed by: SPECIALIST

## 2023-05-26 PROCEDURE — 47563 LAPARO CHOLECYSTECTOMY/GRAPH: CPT | Performed by: SPECIALIST

## 2023-05-26 DEVICE — LARGE LIGATION CLIPS 6 CLIPS/CART
Type: IMPLANTABLE DEVICE | Site: ABDOMEN | Status: FUNCTIONAL
Brand: VAS-Q-CLIP

## 2023-05-26 RX ORDER — LIDOCAINE HYDROCHLORIDE 10 MG/ML
0.5 INJECTION, SOLUTION EPIDURAL; INFILTRATION; INTRACAUDAL; PERINEURAL ONCE AS NEEDED
Status: DISCONTINUED | OUTPATIENT
Start: 2023-05-26 | End: 2023-05-26 | Stop reason: HOSPADM

## 2023-05-26 RX ORDER — DEXMEDETOMIDINE HYDROCHLORIDE 4 UG/ML
INJECTION, SOLUTION INTRAVENOUS AS NEEDED
Status: DISCONTINUED | OUTPATIENT
Start: 2023-05-26 | End: 2023-05-26 | Stop reason: SURG

## 2023-05-26 RX ORDER — SODIUM CHLORIDE 0.9 % (FLUSH) 0.9 %
3 SYRINGE (ML) INJECTION EVERY 12 HOURS SCHEDULED
Status: DISCONTINUED | OUTPATIENT
Start: 2023-05-26 | End: 2023-05-26 | Stop reason: HOSPADM

## 2023-05-26 RX ORDER — LABETALOL HYDROCHLORIDE 5 MG/ML
5 INJECTION, SOLUTION INTRAVENOUS
Status: DISCONTINUED | OUTPATIENT
Start: 2023-05-26 | End: 2023-05-26 | Stop reason: HOSPADM

## 2023-05-26 RX ORDER — IBUPROFEN 600 MG/1
600 TABLET ORAL ONCE AS NEEDED
Status: DISCONTINUED | OUTPATIENT
Start: 2023-05-26 | End: 2023-05-26 | Stop reason: HOSPADM

## 2023-05-26 RX ORDER — ONDANSETRON 2 MG/ML
INJECTION INTRAMUSCULAR; INTRAVENOUS AS NEEDED
Status: DISCONTINUED | OUTPATIENT
Start: 2023-05-26 | End: 2023-05-26 | Stop reason: SURG

## 2023-05-26 RX ORDER — SODIUM CHLORIDE 9 MG/ML
INJECTION, SOLUTION INTRAVENOUS AS NEEDED
Status: DISCONTINUED | OUTPATIENT
Start: 2023-05-26 | End: 2023-05-26 | Stop reason: HOSPADM

## 2023-05-26 RX ORDER — SODIUM CHLORIDE 9 MG/ML
40 INJECTION, SOLUTION INTRAVENOUS AS NEEDED
Status: DISCONTINUED | OUTPATIENT
Start: 2023-05-26 | End: 2023-05-26 | Stop reason: HOSPADM

## 2023-05-26 RX ORDER — FENTANYL CITRATE 50 UG/ML
25 INJECTION, SOLUTION INTRAMUSCULAR; INTRAVENOUS
Status: DISCONTINUED | OUTPATIENT
Start: 2023-05-26 | End: 2023-05-26 | Stop reason: HOSPADM

## 2023-05-26 RX ORDER — SODIUM CHLORIDE 0.9 % (FLUSH) 0.9 %
3-10 SYRINGE (ML) INJECTION AS NEEDED
Status: DISCONTINUED | OUTPATIENT
Start: 2023-05-26 | End: 2023-05-26 | Stop reason: HOSPADM

## 2023-05-26 RX ORDER — OXYCODONE AND ACETAMINOPHEN 7.5; 325 MG/1; MG/1
2 TABLET ORAL EVERY 4 HOURS PRN
Status: DISCONTINUED | OUTPATIENT
Start: 2023-05-26 | End: 2023-05-26 | Stop reason: HOSPADM

## 2023-05-26 RX ORDER — BUPIVACAINE HYDROCHLORIDE AND EPINEPHRINE 5; 5 MG/ML; UG/ML
INJECTION, SOLUTION PERINEURAL AS NEEDED
Status: DISCONTINUED | OUTPATIENT
Start: 2023-05-26 | End: 2023-05-26 | Stop reason: HOSPADM

## 2023-05-26 RX ORDER — OXYCODONE AND ACETAMINOPHEN 10; 325 MG/1; MG/1
1 TABLET ORAL ONCE AS NEEDED
Status: DISCONTINUED | OUTPATIENT
Start: 2023-05-26 | End: 2023-05-26 | Stop reason: HOSPADM

## 2023-05-26 RX ORDER — NALOXONE HCL 0.4 MG/ML
0.4 VIAL (ML) INJECTION AS NEEDED
Status: DISCONTINUED | OUTPATIENT
Start: 2023-05-26 | End: 2023-05-26 | Stop reason: HOSPADM

## 2023-05-26 RX ORDER — MIDAZOLAM HYDROCHLORIDE 1 MG/ML
1 INJECTION INTRAMUSCULAR; INTRAVENOUS
Status: DISCONTINUED | OUTPATIENT
Start: 2023-05-26 | End: 2023-05-26 | Stop reason: HOSPADM

## 2023-05-26 RX ORDER — FENTANYL CITRATE 50 UG/ML
INJECTION, SOLUTION INTRAMUSCULAR; INTRAVENOUS AS NEEDED
Status: DISCONTINUED | OUTPATIENT
Start: 2023-05-26 | End: 2023-05-26 | Stop reason: SURG

## 2023-05-26 RX ORDER — SODIUM CHLORIDE, SODIUM LACTATE, POTASSIUM CHLORIDE, CALCIUM CHLORIDE 600; 310; 30; 20 MG/100ML; MG/100ML; MG/100ML; MG/100ML
100 INJECTION, SOLUTION INTRAVENOUS CONTINUOUS
Status: DISCONTINUED | OUTPATIENT
Start: 2023-05-26 | End: 2023-05-26 | Stop reason: HOSPADM

## 2023-05-26 RX ORDER — SODIUM CHLORIDE 0.9 % (FLUSH) 0.9 %
3 SYRINGE (ML) INJECTION AS NEEDED
Status: DISCONTINUED | OUTPATIENT
Start: 2023-05-26 | End: 2023-05-26 | Stop reason: HOSPADM

## 2023-05-26 RX ORDER — DROPERIDOL 2.5 MG/ML
0.62 INJECTION, SOLUTION INTRAMUSCULAR; INTRAVENOUS ONCE AS NEEDED
Status: DISCONTINUED | OUTPATIENT
Start: 2023-05-26 | End: 2023-05-26 | Stop reason: HOSPADM

## 2023-05-26 RX ORDER — NALOXONE HYDROCHLORIDE 4 MG/.1ML
SPRAY NASAL
Qty: 2 EACH | Refills: 0 | Status: SHIPPED | OUTPATIENT
Start: 2023-05-26

## 2023-05-26 RX ORDER — FLUMAZENIL 0.1 MG/ML
0.2 INJECTION INTRAVENOUS AS NEEDED
Status: DISCONTINUED | OUTPATIENT
Start: 2023-05-26 | End: 2023-05-26 | Stop reason: HOSPADM

## 2023-05-26 RX ORDER — INDOCYANINE GREEN AND WATER 25 MG
3.75 KIT INJECTION ONCE
Status: DISCONTINUED | OUTPATIENT
Start: 2023-05-26 | End: 2023-05-26

## 2023-05-26 RX ORDER — SODIUM CHLORIDE, SODIUM LACTATE, POTASSIUM CHLORIDE, CALCIUM CHLORIDE 600; 310; 30; 20 MG/100ML; MG/100ML; MG/100ML; MG/100ML
1000 INJECTION, SOLUTION INTRAVENOUS CONTINUOUS
Status: DISCONTINUED | OUTPATIENT
Start: 2023-05-26 | End: 2023-05-26 | Stop reason: HOSPADM

## 2023-05-26 RX ORDER — OXYCODONE AND ACETAMINOPHEN 10; 325 MG/1; MG/1
1 TABLET ORAL EVERY 4 HOURS PRN
Qty: 15 TABLET | Refills: 0 | Status: SHIPPED | OUTPATIENT
Start: 2023-05-26

## 2023-05-26 RX ORDER — BUPIVACAINE HCL/0.9 % NACL/PF 0.125 %
PLASTIC BAG, INJECTION (ML) EPIDURAL AS NEEDED
Status: DISCONTINUED | OUTPATIENT
Start: 2023-05-26 | End: 2023-05-26 | Stop reason: SURG

## 2023-05-26 RX ORDER — ONDANSETRON 2 MG/ML
4 INJECTION INTRAMUSCULAR; INTRAVENOUS ONCE AS NEEDED
Status: DISCONTINUED | OUTPATIENT
Start: 2023-05-26 | End: 2023-05-26 | Stop reason: HOSPADM

## 2023-05-26 RX ORDER — ACETAMINOPHEN 500 MG
1000 TABLET ORAL ONCE
Status: COMPLETED | OUTPATIENT
Start: 2023-05-26 | End: 2023-05-26

## 2023-05-26 RX ORDER — SODIUM CHLORIDE 0.9 % (FLUSH) 0.9 %
10 SYRINGE (ML) INJECTION AS NEEDED
Status: DISCONTINUED | OUTPATIENT
Start: 2023-05-26 | End: 2023-05-26 | Stop reason: HOSPADM

## 2023-05-26 RX ORDER — ROCURONIUM BROMIDE 10 MG/ML
INJECTION, SOLUTION INTRAVENOUS AS NEEDED
Status: DISCONTINUED | OUTPATIENT
Start: 2023-05-26 | End: 2023-05-26 | Stop reason: SURG

## 2023-05-26 RX ORDER — LIDOCAINE HYDROCHLORIDE 20 MG/ML
INJECTION, SOLUTION EPIDURAL; INFILTRATION; INTRACAUDAL; PERINEURAL AS NEEDED
Status: DISCONTINUED | OUTPATIENT
Start: 2023-05-26 | End: 2023-05-26 | Stop reason: SURG

## 2023-05-26 RX ORDER — DEXAMETHASONE SODIUM PHOSPHATE 4 MG/ML
INJECTION, SOLUTION INTRA-ARTICULAR; INTRALESIONAL; INTRAMUSCULAR; INTRAVENOUS; SOFT TISSUE AS NEEDED
Status: DISCONTINUED | OUTPATIENT
Start: 2023-05-26 | End: 2023-05-26 | Stop reason: SURG

## 2023-05-26 RX ORDER — PROPOFOL 10 MG/ML
VIAL (ML) INTRAVENOUS AS NEEDED
Status: DISCONTINUED | OUTPATIENT
Start: 2023-05-26 | End: 2023-05-26 | Stop reason: SURG

## 2023-05-26 RX ADMIN — DEXAMETHASONE SODIUM PHOSPHATE 4 MG: 4 INJECTION, SOLUTION INTRAMUSCULAR; INTRAVENOUS at 11:47

## 2023-05-26 RX ADMIN — DEXMEDETOMIDINE HYDROCHLORIDE 10 MCG: 4 INJECTION, SOLUTION INTRAVENOUS at 12:03

## 2023-05-26 RX ADMIN — DEXMEDETOMIDINE HYDROCHLORIDE 10 MCG: 4 INJECTION, SOLUTION INTRAVENOUS at 12:15

## 2023-05-26 RX ADMIN — ACETAMINOPHEN 1000 MG: 500 TABLET, FILM COATED ORAL at 10:35

## 2023-05-26 RX ADMIN — FENTANYL CITRATE 100 MCG: 50 INJECTION, SOLUTION INTRAMUSCULAR; INTRAVENOUS at 12:07

## 2023-05-26 RX ADMIN — LIDOCAINE HYDROCHLORIDE 100 MG: 20 INJECTION, SOLUTION EPIDURAL; INFILTRATION; INTRACAUDAL; PERINEURAL at 11:40

## 2023-05-26 RX ADMIN — SODIUM CHLORIDE, POTASSIUM CHLORIDE, SODIUM LACTATE AND CALCIUM CHLORIDE 1000 ML: 600; 310; 30; 20 INJECTION, SOLUTION INTRAVENOUS at 09:30

## 2023-05-26 RX ADMIN — CEFAZOLIN 2 G: 2 INJECTION, POWDER, FOR SOLUTION INTRAMUSCULAR; INTRAVENOUS at 11:47

## 2023-05-26 RX ADMIN — PROPOFOL INJECTABLE EMULSION 200 MG: 10 INJECTION, EMULSION INTRAVENOUS at 11:40

## 2023-05-26 RX ADMIN — FENTANYL CITRATE 100 MCG: 50 INJECTION, SOLUTION INTRAMUSCULAR; INTRAVENOUS at 11:37

## 2023-05-26 RX ADMIN — SODIUM CHLORIDE, POTASSIUM CHLORIDE, SODIUM LACTATE AND CALCIUM CHLORIDE 1000 ML: 600; 310; 30; 20 INJECTION, SOLUTION INTRAVENOUS at 09:36

## 2023-05-26 RX ADMIN — ONDANSETRON 4 MG: 2 INJECTION INTRAMUSCULAR; INTRAVENOUS at 12:20

## 2023-05-26 RX ADMIN — Medication 200 MCG: at 11:52

## 2023-05-26 RX ADMIN — SUGAMMADEX 200 MG: 100 INJECTION, SOLUTION INTRAVENOUS at 12:24

## 2023-05-26 RX ADMIN — ROCURONIUM BROMIDE 50 MG: 10 SOLUTION INTRAVENOUS at 11:40

## 2023-05-26 NOTE — ANESTHESIA PROCEDURE NOTES
Airway  Urgency: elective    Date/Time: 5/26/2023 11:45 AM    General Information and Staff    Patient location during procedure: OR  CRNA/CAA: Tessy Morgan CRNA  SRNA: Marques Ruiz SRNA  Indications and Patient Condition  Indications for airway management: airway protection    Preoxygenated: yes  Mask difficulty assessment: 0 - not attempted    Final Airway Details  Final airway type: endotracheal airway      Successful airway: ETT  Cuffed: yes   Successful intubation technique: video laryngoscopy  Facilitating devices/methods: intubating stylet  Endotracheal tube insertion site: oral  Blade: Chau  ETT size (mm): 7.0  Cormack-Lehane Classification: grade I - full view of glottis  Placement verified by: chest auscultation and capnometry   Cuff volume (mL): 5  Measured from: lips  ETT/EBT  to lips (cm): 21  Number of attempts at approach: 1  Assessment: lips, teeth, and gum same as pre-op and atraumatic intubation

## 2023-05-26 NOTE — ANESTHESIA PREPROCEDURE EVALUATION
Anesthesia Evaluation     Patient summary reviewed and Nursing notes reviewed   no history of anesthetic complications:   NPO Solid Status: > 8 hours  NPO Liquid Status: > 8 hours           Airway   Mallampati: I  TM distance: >3 FB  Neck ROM: full  No difficulty expected  Dental      Pulmonary    (-) not a smoker  Cardiovascular   Exercise tolerance: good (4-7 METS)    (-) hypertension, CAD      Neuro/Psych  (-) seizures, TIA, CVA  GI/Hepatic/Renal/Endo    (+) diabetes mellitus (borderline)  (-) liver disease, no renal disease    Musculoskeletal     Abdominal    Substance History      OB/GYN          Other                      Anesthesia Plan    ASA 2     general     intravenous induction     Anesthetic plan, risks, benefits, and alternatives have been provided, discussed and informed consent has been obtained with: patient.    CODE STATUS:

## 2023-05-26 NOTE — OP NOTE
CHOLECYSTECTOMY LAPAROSCOPIC WITH DAVINCI ROBOT  Procedure Note    Mica Humphrey  5/26/2023    Pre-op Diagnosis:   Calculus of gallbladder without cholecystitis without obstruction [K80.20]    Post-op Diagnosis:     same    Procedure/CPT® Codes:  Laparoscopic cholecystectomy using da Vidya robot with ICG cholangiography            Anesthesia: General    Staff:   Circulator: Seb Allen RN  Scrub Person: Nahomy Copeland; Hilary Jorge; Jaqui Anderson; Sofie Jones    Estimated Blood Loss:  Minimal    Specimens:                Specimens       ID Source Type Tests Collected By Collected At Frozen?    A Gallbladder Tissue TISSUE PATHOLOGY EXAM   Tiffany Ortega MD 5/26/23 1203 No    Description: GALLBLADDER AND CONTENTS    This specimen was not marked as sent.              Drains: * No LDAs found *    Findings: Chronic cholecystitis    Complications: none          Date: 5/26/2023  Time: 12:23 CDT        The patient was brought to the operating room and placed in supine position. After induction of anesthesia and infusion of antibiotics, the patient was prepped and draped in the usual sterile fashion. Veress needle technique was used. Standard 4 ports were placed for robotic cholecystectomy. The gallbladder was grasped and retracted superiorly. The infundibulum was grasped and retracted laterally.  It was mildly distended.  There was enlargement of the medial aspect of the left lateral lobe that was hindering visualization to some degree but we able retracted out of the way with my instrument.  The cystic duct and cystic artery were identified and isolated.  ICG cholangiography was performed.  Good visualization of the cystic duct gallbladder junction was noted.  We are able to see the common duct and the fatty tissue of close triangle well away from the dissection plane.  Clips were placed on the artery and the duct and then the structures were divided.. The gallbladder was removed from its bed using  electrocautery, placed in Endo bag and removed. All ports were removed. Fascia at the 10 mm port site was closed with Vicryl. The skin was reapproximated with #4-0 subcuticular. Then 0.5% Marcaine was injected for local anesthesia. Dressing was placed. The patient was awakened and transferred to the recovery room in stable condition. The patient tolerated the procedure well. At the end of the procedure, all counts were correct.    Tiffany Ortega MD

## 2023-05-26 NOTE — ANESTHESIA POSTPROCEDURE EVALUATION
Patient: Mica Humphrey    Procedure Summary       Date: 05/26/23 Room / Location:  [facility] OR 06 /  [facility]    Anesthesia Start: 1137 Anesthesia Stop: 1245    Procedure: CHOLECYSTECTOMY LAPAROSCOPIC WITH DAVINCI ROBOT (Abdomen) Diagnosis:       Calculus of gallbladder without cholecystitis without obstruction      (Calculus of gallbladder without cholecystitis without obstruction [K80.20])    Surgeons: Tiffany Ortega MD Provider: Tessy Morgan CRNA    Anesthesia Type: general ASA Status: 2            Anesthesia Type: general    Vitals  Vitals Value Taken Time   /70 05/26/23 1312   Temp 97.6 °F (36.4 °C) 05/26/23 1310   Pulse 62 05/26/23 1312   Resp 14 05/26/23 1310   SpO2 94 % 05/26/23 1312   Vitals shown include unvalidated device data.        Post Anesthesia Care and Evaluation    Patient location during evaluation: PACU  Patient participation: complete - patient participated  Level of consciousness: awake and alert  Pain management: adequate    Airway patency: patent  Anesthetic complications: No anesthetic complications    Cardiovascular status: acceptable  Respiratory status: acceptable  Hydration status: acceptable    Comments: Blood pressure 141/77, pulse 65, temperature 97.6 °F (36.4 °C), temperature source Temporal, resp. rate 16, SpO2 94 %, not currently breastfeeding.    Pt discharged from PACU based on micheal score >8

## 2023-05-30 LAB
CYTO UR: NORMAL
LAB AP CASE REPORT: NORMAL
Lab: NORMAL
PATH REPORT.FINAL DX SPEC: NORMAL
PATH REPORT.GROSS SPEC: NORMAL

## 2023-06-15 ENCOUNTER — TELEPHONE (OUTPATIENT)
Dept: SURGERY | Facility: CLINIC | Age: 63
End: 2023-06-15

## 2023-06-15 NOTE — TELEPHONE ENCOUNTER
SAME DAY CANCEL APPT    Caller: CONNIE SHAY    Relationship to patient: SELF    Best call back number: [phone]    Patient is needing: SAME DAY CANCEL APPT; HEALING WELL AND NO ISSUES.

## (undated) DEVICE — ENDOGATOR AUXILIARY WATER JET CONNECTOR: Brand: ENDOGATOR

## (undated) DEVICE — MASK,OXYGEN,MED CONC,ADLT,7' TUB, UC: Brand: PENDING

## (undated) DEVICE — YANKAUER,BULB TIP WITH VENT: Brand: ARGYLE

## (undated) DEVICE — TRAP FLD MINIVAC MEGADYNE 100ML

## (undated) DEVICE — 4-PORT MANIFOLD: Brand: NEPTUNE 2

## (undated) DEVICE — DAVINCI: Brand: MEDLINE INDUSTRIES, INC.

## (undated) DEVICE — CANNULA SEAL

## (undated) DEVICE — BAPTIST TURNOVER KIT: Brand: MEDLINE INDUSTRIES, INC.

## (undated) DEVICE — ADHS LIQ MASTISOL 2/3ML

## (undated) DEVICE — TBG SMPL FLTR LINE NASL 02/C02 A/ BX/100

## (undated) DEVICE — BLADELESS OBTURATOR: Brand: WECK VISTA

## (undated) DEVICE — NDL HYPO PRECISIONGLIDE REG 22G 1 1/2

## (undated) DEVICE — GLV SURG BIOGEL M LTX PF 7 1/2

## (undated) DEVICE — ST TBG AIRSEAL FLTR TRI LUM

## (undated) DEVICE — STRIP CLS WND CURAD MEDI/STRIP HYPOALLERG 0.25X4IN PK/10

## (undated) DEVICE — Device: Brand: DEFENDO AIR/WATER/SUCTION AND BIOPSY VALVE

## (undated) DEVICE — TISSUE RETRIEVAL SYSTEM: Brand: INZII RETRIEVAL SYSTEM

## (undated) DEVICE — SENSR O2 OXIMAX FNGR A/ 18IN NONSTR

## (undated) DEVICE — ANTIBACTERIAL UNDYED BRAIDED (POLYGLACTIN 910), SYNTHETIC ABSORBABLE SUTURE: Brand: COATED VICRYL

## (undated) DEVICE — THE CHANNEL CLEANING BRUSH IS A NYLON FLEXI BRUSH ATTACHED TO A FLEXIBLE PLASTIC SHEATH DESIGNED TO SAFELY REMOVE DEBRIS FROM FLEXIBLE ENDOSCOPES.

## (undated) DEVICE — ARM DRAPE

## (undated) DEVICE — TIP COVER ACCESSORY

## (undated) DEVICE — DRSNG SURESITE WNDW 2.38X2.75

## (undated) DEVICE — KT CLN CLEANOR SCPE

## (undated) DEVICE — SPNG GZ 2S 2X2 8PLY STRL PK/2